# Patient Record
Sex: FEMALE | Race: WHITE | NOT HISPANIC OR LATINO | URBAN - METROPOLITAN AREA
[De-identification: names, ages, dates, MRNs, and addresses within clinical notes are randomized per-mention and may not be internally consistent; named-entity substitution may affect disease eponyms.]

---

## 2023-06-06 ENCOUNTER — OFFICE (OUTPATIENT)
Dept: URBAN - METROPOLITAN AREA CLINIC 8 | Facility: CLINIC | Age: 53
End: 2023-06-06

## 2023-06-06 VITALS
HEART RATE: 45 BPM | DIASTOLIC BLOOD PRESSURE: 73 MMHG | HEIGHT: 63 IN | SYSTOLIC BLOOD PRESSURE: 133 MMHG | WEIGHT: 224 LBS

## 2023-06-06 DIAGNOSIS — K58.2 MIXED IRRITABLE BOWEL SYNDROME: ICD-10-CM

## 2023-06-06 PROCEDURE — 99203 OFFICE O/P NEW LOW 30 MIN: CPT | Performed by: NURSE PRACTITIONER

## 2023-06-06 RX ORDER — POLYETHYLENE GLYCOL 3350, SODIUM SULFATE, SODIUM CHLORIDE, POTASSIUM CHLORIDE, ASCORBIC ACID, SODIUM ASCORBATE 140-9-5.2G
KIT ORAL
Qty: 1 | Refills: 0 | Status: ACTIVE
Start: 2023-06-06

## 2023-06-06 NOTE — SERVICEHPINOTES
Ms. Perrin  is a pleasant 53-year-old  female with a PMH significant for hypercholesterolemia, anxiety /depression, thyroid disease, allergies.  Patient presents due to recent episode of abdominal pain, which has since resolved.  Labs from PCP completely  unrevealing.  At baseline she tends to have more loose stools passing anywhere from 2-4 semi- loose to formed stools per day.  No blood or mucus.   Some relation to p.o. intake.   She has never been screened for celiac disease.  No issue with dairy.  She has never underwent EGD or colonoscopy for any reason the past.  Like above, her abdominal pain has since completely resolved.  She would like to schedule screening colonoscopy.  Does not see cardiologist for any reason.  Discussed probiotic to see if this helps with her alternating bowel habits.

## 2023-06-08 LAB
IMMUNOGLOBULIN A, QN, SERUM: 77 MG/DL — LOW (ref 87–352)
T-TRANSGLUTAMINASE (TTG) IGA: <2 U/ML